# Patient Record
Sex: FEMALE | Race: BLACK OR AFRICAN AMERICAN | Employment: FULL TIME | URBAN - METROPOLITAN AREA
[De-identification: names, ages, dates, MRNs, and addresses within clinical notes are randomized per-mention and may not be internally consistent; named-entity substitution may affect disease eponyms.]

---

## 2017-06-12 ENCOUNTER — APPOINTMENT (OUTPATIENT)
Dept: GENERAL RADIOLOGY | Age: 47
End: 2017-06-12
Attending: PHYSICIAN ASSISTANT
Payer: SELF-PAY

## 2017-06-12 ENCOUNTER — HOSPITAL ENCOUNTER (EMERGENCY)
Age: 47
Discharge: HOME | End: 2017-06-12
Attending: EMERGENCY MEDICINE
Payer: SELF-PAY

## 2017-06-12 VITALS
BODY MASS INDEX: 51.91 KG/M2 | RESPIRATION RATE: 17 BRPM | HEIGHT: 63 IN | WEIGHT: 293 LBS | DIASTOLIC BLOOD PRESSURE: 83 MMHG | HEART RATE: 75 BPM | OXYGEN SATURATION: 100 % | SYSTOLIC BLOOD PRESSURE: 136 MMHG | TEMPERATURE: 97.7 F

## 2017-06-12 DIAGNOSIS — R13.10 DYSPHAGIA, UNSPECIFIED TYPE: Primary | ICD-10-CM

## 2017-06-12 PROCEDURE — 99281 EMR DPT VST MAYX REQ PHY/QHP: CPT

## 2017-06-12 PROCEDURE — 70360 X-RAY EXAM OF NECK: CPT

## 2017-06-12 RX ORDER — CIPROFLOXACIN 500 MG/1
TABLET ORAL 2 TIMES DAILY
COMMUNITY
End: 2017-12-18

## 2017-06-12 RX ORDER — HYDROCHLOROTHIAZIDE 25 MG/1
25 TABLET ORAL DAILY
COMMUNITY

## 2017-06-12 NOTE — ED TRIAGE NOTES
Pt reports having allergic reaction today. Upon arrival to triage, pt conversing freely and without any difficulty. Pt holding cell phone conversation without any difficulty. Pt reports was having \"trouble swallowing\" during allergic reaction. Pt presents in NAD. Pt now stating started as sore throat x 3 weeks.

## 2017-06-13 NOTE — DISCHARGE INSTRUCTIONS
Learning About Swallowing Problems  What are swallowing problems? Certain health problems that affect the nervous system can cause trouble swallowing. These conditions include stroke, ALS (also known as Mavis Gehrig's disease), Parkinson's disease, and multiple sclerosis. The muscles and nerves that help move food through the throat and esophagus may not work right. Growths, such as cancer, and other problems with your esophagus can also make it hard to swallow. The esophagus is the tube that leads from your throat to your stomach. How are swallowing problems diagnosed? A doctor or speech therapist will examine you to check for swallowing problems. You may get swallowing tests to check how well your throat muscles work. For these tests, you swallow a special liquid that helps the doctor see your throat and esophagus on an X-ray or video screen. Other tests use a thin, flexible tube called a scope to check for problems with your esophagus. The doctor puts the scope in your mouth and down your throat to look at your esophagus. What are the symptoms? Symptoms of swallowing problems may include:  · Trouble getting food or liquids to go down on the first try. · Gagging, choking, or coughing when you swallow. · Having food or liquids come back up through your throat, mouth, or nose after you swallow. · Feeling like foods or liquids are stuck in some part of your throat or chest.  · Pain when you swallow. How are swallowing problems treated? How swallowing problems are treated depends on the cause. The main goals of treatment will be to help you eat and swallow safely and get good nutrition. This is important for your health and quality of life. You may learn exercises to train your throat muscles to work together so you're able to swallow better. Learning certain ways to put food in your mouth or to position your head while eating may also help.   Your doctor or a speech therapist may recommend changes to your diet to help make it easier to swallow. You may need to avoid certain foods or liquids. You also may need to change the thickness of foods or liquids in your diet. To eat and swallow safely, follow any instructions you get from your doctor or therapist. These ideas may help:  · Sit upright when eating, drinking, and taking pills. · Take small bites of food. Chew completely and swallow before taking another bite. · Take small sips of liquids. Hold the liquid in your mouth as you prepare to swallow. · If eating makes you tired, eat smaller but more frequent meals. · If you cough or choke, lean forward and keep your chin tipped downward while you cough. Where can you learn more? Go to http://deana-kim.info/. Enter 153 [id number] in the search box to learn more about \"Learning About Swallowing Problems. \"  Current as of: May 27, 2016  Content Version: 11.2  © 7908-0167 [employer]. Care instructions adapted under license by [employer] (which disclaims liability or warranty for this information). If you have questions about a medical condition or this instruction, always ask your healthcare professional. [place] [address] any warranty or liability for your use of this information.

## 2017-06-13 NOTE — ED PROVIDER NOTES
[facility]Y DEPT      56 y.o. female with a PMH of Asthma, HTN and Obesity presents to the ED c/o difficulty swallowing for the past 3 weeks. Pt states when she swallows food she has to drink water down to make sure it washes down. Says she also has a hard time breathing when she has her head in a certain position. She does not recall any precipitating event. Patient is able to eat and drink. Denies fever, chills, rash, sudden onset throat swelling, or other symptoms at this time. No current facility-administered medications for this encounter. Current Outpatient Prescriptions   Medication Sig    ciprofloxacin HCl (CIPRO) 500 mg tablet Take  by mouth two (2) times a day.  hydroCHLOROthiazide (HYDRODIURIL) 25 mg tablet Take 25 mg by mouth daily.  LOSARTAN PO Take  by mouth.  ascorbic acid (VITAMIN C) 500 mg tablet Take  by mouth.  naproxen sodium (ALEVE) 220 mg tablet Take 220 mg by mouth two (2) times daily (with meals).  multivitamin with iron (DAILY MULTI-VITAMINS/IRON) tablet Take 1 Tab by mouth daily. Past Medical History:   Diagnosis Date    Asthma     Hypertension     Obese        Past Surgical History:   Procedure Laterality Date    HX ACL RECONSTRUCTION      right knee    HX GYN      hysterectomy       Family History   Problem Relation Age of Onset    Hypertension Other     Heart Disease Other     Diabetes Other     Cancer Other     Stroke Neg Hx        Social History     Social History    Marital status: LEGALLY      Spouse name: N/A    Number of children: N/A    Years of education: N/A     Occupational History    Not on file.      Social History Main Topics    Smoking status: Never Smoker    Smokeless tobacco: Not on file    Alcohol use No    Drug use: Not on file    Sexual activity: Not on file     Other Topics Concern    Not on file     Social History Narrative       Allergies   Allergen Reactions    Iodine And Iodide Containing Products Unknown (comments)       Patient's primary care provider (as noted in EPIC):  PROVIDER UNKNOWN    REVIEW OF SYSTEMS:    REVIEW OF SYSTEMS:  Constitutional:  Negative for fever, diaphoresis. HENT:  + dysphagia. Negative for congestion. Respiratory:  + SOB when moves head in certain positions. Negative for cough. Cardiovascular:  Negative for chest pain and palpitations. Musculoskeletal:  Negative for back pain. Skin:  Negative for pallor. Neurological:  Negative for weakness. All other systems negative as reviewed. Visit Vitals    /83    Pulse 75    Temp 97.7 °F (36.5 °C)    Resp 17    Ht 5' 3\" (1.6 m)    Wt 135.2 kg (298 lb)    SpO2 100%    BMI 52.79 kg/m2       PHYSICAL EXAM:    CONSTITUTIONAL:  Alert, in no apparent distress; morbidly obese. HEAD:  Normocephalic, atraumatic. EYES:  EOMI. Non-icteric sclera. Normal conjunctiva. ENTM:  Nose:  no rhinorrhea. Throat:  No tonsillar erythema, edema, or exudates, mucous membranes moist. Uvula midline, airway patent. NECK:  Supple  RESPIRATORY:  Talking in complete sentences, in NAD. Chest clear, equal breath sounds, good air movement. Without wheezes, rhonchi or rales. CARDIOVASCULAR:  Regular rate and rhythm. No murmurs, rubs, or gallops. NEURO:  Moves all four extremities, and grossly normal motor exam.  SKIN:  No rashes;  Normal for age. PSYCH:  Alert and normal affect. DIFFERENTIAL DIAGNOSES/ MEDICAL DECISION MAKING:  FB, esophageal stricture, allergic reaction, pharyngitis, versus other etiologies. ED COURSE:      Pt notes 3 weeks of dysphagia -- notes when she eats something she has to wash it down with water. Pt is able to eat and drink. Xray soft tissue neck without any FB. Also notes feeling SOB when she moves her head in certain directions or lays down with her head to the side. O2 sat and RR WNL, speaking in full sentences, in no distress. She does not have a rash.   I do not think this is an allergic reaction, despite nursing note. Likely esophageal FB vs. Stricture. Could also be anxiety. Will have her f/u with GI. Diagnosis:   1. Dysphagia, unspecified type      Disposition: Discharge    Follow-up Information     Follow up With Details Comments [address], [place] In 3 days  [address]  [facility] [address] [facility] EMERGENCY DEPT  If symptoms worsen [address] [facility]  [phone]          Patient's Medications   Start Taking    No medications on file   Continue Taking    ASCORBIC ACID (VITAMIN C) 500 MG TABLET    Take  by mouth. CIPROFLOXACIN HCL (CIPRO) 500 MG TABLET    Take  by mouth two (2) times a day. HYDROCHLOROTHIAZIDE (HYDRODIURIL) 25 MG TABLET    Take 25 mg by mouth daily. LOSARTAN PO    Take  by mouth. MULTIVITAMIN WITH IRON (DAILY MULTI-VITAMINS/IRON) TABLET    Take 1 Tab by mouth daily. NAPROXEN SODIUM (ALEVE) 220 MG TABLET    Take 220 mg by mouth two (2) times daily (with meals).      These Medications have changed    No medications on file   Stop Taking    No medications on file     ZAMZAM Good

## 2017-12-18 ENCOUNTER — HOSPITAL ENCOUNTER (EMERGENCY)
Age: 47
Discharge: HOME | End: 2017-12-18
Attending: EMERGENCY MEDICINE
Payer: SELF-PAY

## 2017-12-18 VITALS
HEIGHT: 63 IN | BODY MASS INDEX: 51.91 KG/M2 | SYSTOLIC BLOOD PRESSURE: 144 MMHG | HEART RATE: 62 BPM | RESPIRATION RATE: 20 BRPM | TEMPERATURE: 98.2 F | OXYGEN SATURATION: 99 % | DIASTOLIC BLOOD PRESSURE: 80 MMHG | WEIGHT: 293 LBS

## 2017-12-18 DIAGNOSIS — K02.9 INFECTED DENTAL CARIES: Primary | ICD-10-CM

## 2017-12-18 DIAGNOSIS — K04.7 INFECTED DENTAL CARIES: Primary | ICD-10-CM

## 2017-12-18 PROCEDURE — 99282 EMERGENCY DEPT VISIT SF MDM: CPT

## 2017-12-18 RX ORDER — PENICILLIN V POTASSIUM 500 MG/1
500 TABLET, FILM COATED ORAL 4 TIMES DAILY
Qty: 40 TAB | Refills: 0 | Status: SHIPPED | OUTPATIENT
Start: 2017-12-18 | End: 2017-12-28

## 2017-12-18 RX ORDER — HYDROCODONE BITARTRATE AND ACETAMINOPHEN 5; 325 MG/1; MG/1
TABLET ORAL
Qty: 12 TAB | Refills: 0 | Status: SHIPPED | OUTPATIENT
Start: 2017-12-18 | End: 2020-02-01

## 2017-12-18 NOTE — DISCHARGE INSTRUCTIONS
Tooth Decay: Care Instructions  Your Care Instructions    Tooth decay is damage to a tooth caused by plaque. Plaque is a thin film of bacteria that sticks to the teeth above and below the gum line. If plaque isn't removed from the teeth, it can build up and harden into tartar. The bacteria in plaque and tartar use sugars in food to make acids. These acids can cause tooth decay and gum disease. Any part of your tooth can decay, from the roots below the gum line to the chewing surface. Decay can affect the outer layer (enamel) or inner layer (dentin) of your teeth. The deeper the decay, the worse the damage. Untreated tooth decay will get worse and may lead to tooth loss. If you have a small hole (cavity) in your tooth, your dentist can repair it by removing the decay and filling the hole. If you have deeper decay, you may need more treatment. A very badly damaged tooth may have to be removed. Follow-up care is a key part of your treatment and safety. Be sure to make and go to all appointments, and call your dentist if you are having problems. It's also a good idea to know your test results and keep a list of the medicines you take. How can you care for yourself at home? If you have pain:  · Take an over-the-counter pain medicine, such as acetaminophen (Tylenol), ibuprofen (Advil, Motrin), or naproxen (Aleve). Be safe with medicines. Read and follow all instructions on the label. ¨ Do not take two or more pain medicines at the same time unless the doctor told you to. Many pain medicines have acetaminophen, which is Tylenol. Too much acetaminophen (Tylenol) can be harmful. · Put ice or a cold pack on your cheek over the tooth for 10 to 15 minutes at a time. Put a thin cloth between the ice and your skin. To prevent tooth decay  · Brush teeth twice a day, and floss once a day. Brushing with fluoride toothpaste and flossing may be enough to reverse early decay.   · Use a toothbrush with soft, rounded-end bristles and a head that is small enough to reach all parts of your teeth and mouth. Replace your toothbrush every 3 or 4 months. You may also use an electric toothbrush that has rotating and oscillating (back-and-forth) action. · Ask your dentist about having fluoride treatments at the dental office. · Brush your tongue to help get rid of bacteria. · Eat healthy foods that include whole grains, vegetables, and fruits. · Have your teeth cleaned by a professional at least two times a year. · Do not smoke or use smokeless tobacco. Tobacco can make tooth decay worse. When should you call for help? Call 911 anytime you think you may need emergency care. For example, call if:  ? · You have trouble breathing. ?Call your dentist now or seek immediate medical care if:  ? · You have new or worse symptoms of infection, such as:  ¨ Increased pain, swelling, warmth, or redness. ¨ Red streaks leading from the area. ¨ Pus draining from the area. ¨ A fever. ? Watch closely for changes in your health, and be sure to contact your doctor if:  ? · You do not get better as expected. Where can you learn more? Go to http://deana-kim.info/. Enter F909 in the search box to learn more about \"Tooth Decay: Care Instructions. \"  Current as of: May 12, 2017  Content Version: 11.4  © 4402-2864 [employer]. Care instructions adapted under license by [employer] (which disclaims liability or warranty for this information). If you have questions about a medical condition or this instruction, always ask your healthcare professional. [place] [address] any warranty or liability for your use of this information.

## 2017-12-18 NOTE — ED PROVIDER NOTES
Patient is a 56 y.o. female presenting with dental problem. The history is provided by the patient. Dental Pain    This is a new problem. The current episode started 12 to 24 hours ago. The problem occurs constantly. The problem has not changed since onset. The pain is located in the right upper mouth. The quality of the pain is aching. The pain is mild. Associated symptoms include gum redness. There was no vomiting, no nausea, no fever, no swelling, no chest pain, no shortness of breath, no headaches and no drainage. She has tried nothing for the symptoms. The treatment provided no relief. The patient has no cardiac history. Past Medical History:   Diagnosis Date    Asthma     Hypertension     Obese        Past Surgical History:   Procedure Laterality Date    HX ACL RECONSTRUCTION      right knee    HX GYN      hysterectomy         Family History:   Problem Relation Age of Onset    Hypertension Other     Heart Disease Other     Diabetes Other     Cancer Other     Stroke Neg Hx        Social History     Social History    Marital status: LEGALLY      Spouse name: N/A    Number of children: N/A    Years of education: N/A     Occupational History    Not on file. Social History Main Topics    Smoking status: Never Smoker    Smokeless tobacco: Never Used    Alcohol use No    Drug use: Not on file    Sexual activity: Not on file     Other Topics Concern    Not on file     Social History Narrative         ALLERGIES: Iodine and iodide containing products    Review of Systems   HENT: Positive for dental problem. Vitals:    12/18/17 1702   BP: 144/80   Pulse: 62   Resp: 20   Temp: 98.2 °F (36.8 °C)   SpO2: 99%   Weight: 142 kg (313 lb)   Height: 5' 3\" (1.6 m)            Physical Exam   Constitutional: She is oriented to person, place, and time. She appears well-developed and well-nourished. No distress. HENT:   Head: Normocephalic and atraumatic.    Mouth/Throat: Uvula is midline, oropharynx is clear and moist and mucous membranes are normal. She does not have dentures. No oral lesions. No trismus in the jaw. Normal dentition. Dental caries present. No dental abscesses, uvula swelling or lacerations. No oropharyngeal exudate, posterior oropharyngeal edema, posterior oropharyngeal erythema or tonsillar abscesses. Dental caries noted to RT upper molars with mild gum redness to surrounding gums. No abscess. No fluctuance. TTP. Airway is patent. tolerating secretions. Eyes: Conjunctivae are normal.   Neck: Normal range of motion. Neck supple. Cardiovascular: Normal rate, regular rhythm and normal heart sounds. Pulmonary/Chest: Effort normal and breath sounds normal. No respiratory distress. She has no wheezes. She exhibits no tenderness. Abdominal: Soft. She exhibits no distension. There is no tenderness. Musculoskeletal: Normal range of motion. Neurological: She is alert and oriented to person, place, and time. No cranial nerve deficit. Coordination normal.   Skin: Skin is warm and dry. No rash noted. She is not diaphoretic. Psychiatric: She has a normal mood and affect. Nursing note and vitals reviewed. MDM  Number of Diagnoses or Management Options  Infected dental caries:   Diagnosis management comments: No concern for sepsis, bacteremia. Will tx with pcn and norco.    Will refer to dentist and give strict return sx.      ED Course       Procedures

## 2019-08-23 ENCOUNTER — HOSPITAL ENCOUNTER (OUTPATIENT)
Dept: MAMMOGRAPHY | Age: 49
Discharge: HOME | End: 2019-08-23
Attending: INTERNAL MEDICINE
Payer: COMMERCIAL

## 2019-08-23 DIAGNOSIS — Z12.39 SCREENING FOR BREAST CANCER: ICD-10-CM

## 2019-08-23 PROCEDURE — 77063 BREAST TOMOSYNTHESIS BI: CPT

## 2020-02-01 ENCOUNTER — HOSPITAL ENCOUNTER (EMERGENCY)
Age: 50
Discharge: HOME | End: 2020-02-01
Attending: EMERGENCY MEDICINE
Payer: COMMERCIAL

## 2020-02-01 VITALS
OXYGEN SATURATION: 100 % | HEART RATE: 64 BPM | SYSTOLIC BLOOD PRESSURE: 163 MMHG | DIASTOLIC BLOOD PRESSURE: 96 MMHG | TEMPERATURE: 97.8 F | RESPIRATION RATE: 24 BRPM | WEIGHT: 293 LBS | BODY MASS INDEX: 51.91 KG/M2 | HEIGHT: 63 IN

## 2020-02-01 DIAGNOSIS — J45.21 INTERMITTENT ASTHMA WITH ACUTE EXACERBATION, UNSPECIFIED ASTHMA SEVERITY: Primary | ICD-10-CM

## 2020-02-01 PROCEDURE — 99284 EMERGENCY DEPT VISIT MOD MDM: CPT

## 2020-02-01 PROCEDURE — 94640 AIRWAY INHALATION TREATMENT: CPT

## 2020-02-01 PROCEDURE — 74011636637 HC RX REV CODE- 636/637: Performed by: EMERGENCY MEDICINE

## 2020-02-01 PROCEDURE — 74011000250 HC RX REV CODE- 250: Performed by: EMERGENCY MEDICINE

## 2020-02-01 RX ORDER — ALBUTEROL SULFATE 0.83 MG/ML
2.5 SOLUTION RESPIRATORY (INHALATION)
Status: COMPLETED | OUTPATIENT
Start: 2020-02-01 | End: 2020-02-01

## 2020-02-01 RX ORDER — FLUTICASONE PROPIONATE AND SALMETEROL 100; 50 UG/1; UG/1
1 POWDER RESPIRATORY (INHALATION) 2 TIMES DAILY
COMMUNITY
End: 2020-02-01

## 2020-02-01 RX ORDER — FLUTICASONE PROPIONATE AND SALMETEROL 100; 50 UG/1; UG/1
POWDER RESPIRATORY (INHALATION)
Qty: 1 INHALER | Refills: 0 | Status: SHIPPED | OUTPATIENT
Start: 2020-02-01

## 2020-02-01 RX ORDER — IPRATROPIUM BROMIDE AND ALBUTEROL SULFATE 2.5; .5 MG/3ML; MG/3ML
3 SOLUTION RESPIRATORY (INHALATION)
Status: COMPLETED | OUTPATIENT
Start: 2020-02-01 | End: 2020-02-01

## 2020-02-01 RX ORDER — PREDNISONE 20 MG/1
60 TABLET ORAL
Status: COMPLETED | OUTPATIENT
Start: 2020-02-01 | End: 2020-02-01

## 2020-02-01 RX ORDER — ALBUTEROL SULFATE 90 UG/1
AEROSOL, METERED RESPIRATORY (INHALATION)
COMMUNITY
Start: 2019-12-07 | End: 2020-12-06

## 2020-02-01 RX ADMIN — IPRATROPIUM BROMIDE AND ALBUTEROL SULFATE 3 ML: .5; 3 SOLUTION RESPIRATORY (INHALATION) at 16:35

## 2020-02-01 RX ADMIN — ALBUTEROL SULFATE 2.5 MG: 2.5 SOLUTION RESPIRATORY (INHALATION) at 17:06

## 2020-02-01 RX ADMIN — PREDNISONE 60 MG: 20 TABLET ORAL at 17:06

## 2020-02-01 RX ADMIN — ALBUTEROL SULFATE 2.5 MG: 2.5 SOLUTION RESPIRATORY (INHALATION) at 16:49

## 2020-02-01 NOTE — ED NOTES
Patient states that she had been taking Advair prn. Per PCP office notes, patient's Advair prescribed bid.

## 2020-02-01 NOTE — DISCHARGE INSTRUCTIONS

## 2020-02-01 NOTE — ED PROVIDER NOTES
HPI patient says she ran out of her home steroid inhaler about 2 days ago. She says since then she has had progressively worse wheezing and shortness of breath. She states a past history of asthma and says as long as she is using her inhalers as prescribed she has very few asthma exacerbations. She denies any fever chills nausea vomiting or abdominal pain. No other complaints at this time.     Past Medical History:   Diagnosis Date    Asthma     Hypertension     Obese        Past Surgical History:   Procedure Laterality Date    HX ACL RECONSTRUCTION      right knee    HX GYN      hysterectomy         Family History:   Problem Relation Age of Onset    Hypertension Other     Heart Disease Other     Diabetes Other     Cancer Other     Stroke Neg Hx        Social History     Socioeconomic History    Marital status: LEGALLY      Spouse name: Not on file    Number of children: Not on file    Years of education: Not on file    Highest education level: Not on file   Occupational History    Not on file   Social Needs    Financial resource strain: Not on file    Food insecurity:     Worry: Not on file     Inability: Not on file    Transportation needs:     Medical: Not on file     Non-medical: Not on file   Tobacco Use    Smoking status: Never Smoker    Smokeless tobacco: Never Used   Substance and Sexual Activity    Alcohol use: No    Drug use: Not on file    Sexual activity: Not on file   Lifestyle    Physical activity:     Days per week: Not on file     Minutes per session: Not on file    Stress: Not on file   Relationships    Social connections:     Talks on phone: Not on file     Gets together: Not on file     Attends [employer] service: Not on file     Active member of club or organization: Not on file     Attends meetings of clubs or organizations: Not on file     Relationship status: Not on file    Intimate partner violence:     Fear of current or ex partner: Not on file Emotionally abused: Not on file     Physically abused: Not on file     Forced sexual activity: Not on file   Other Topics Concern    Not on file   Social History Narrative    Not on file         ALLERGIES: Iodine and iodide containing products and Seafood    Review of Systems   Constitutional: Negative. HENT: Negative. Eyes: Negative. Respiratory: Positive for shortness of breath and wheezing. Cardiovascular: Negative. Gastrointestinal: Negative. Genitourinary: Negative. Musculoskeletal: Negative. Skin: Negative. Neurological: Negative. Psychiatric/Behavioral: Negative. Vitals:    02/01/20 1640 02/01/20 1645   BP: (!) 173/96 140/80   Pulse: 64    Resp: 24    Temp: 97.8 °F (36.6 °C)    SpO2: 100% 100%   Weight: 140.6 kg (310 lb)    Height: 5' 3\" (1.6 m)             Physical Exam  Vitals signs and nursing note reviewed. Constitutional:       Appearance: She is well-developed. HENT:      Head: Normocephalic and atraumatic. Eyes:      Conjunctiva/sclera: Conjunctivae normal.      Pupils: Pupils are equal, round, and reactive to light. Neck:      Musculoskeletal: Normal range of motion and neck supple. Cardiovascular:      Rate and Rhythm: Normal rate and regular rhythm. Pulmonary:      Effort: Pulmonary effort is normal.      Breath sounds: Wheezing present. Comments: Diffuse wheezes bilaterally with good air movement and normal work of breathing. Abdominal:      Palpations: Abdomen is soft. Musculoskeletal: Normal range of motion. Skin:     General: Skin is warm and dry. Neurological:      Mental Status: She is alert and oriented to person, place, and time. MDM         Procedures      Patient says she is feeling much better now after several nebulizer treatments. She says she is ready to go home and understands and agrees with the disposition and follow-up plan.   Dustin Young MD 5:45 PM

## 2020-03-31 ENCOUNTER — HOSPITAL ENCOUNTER (EMERGENCY)
Age: 50
Discharge: HOME | End: 2020-03-31
Attending: EMERGENCY MEDICINE
Payer: COMMERCIAL

## 2020-03-31 ENCOUNTER — APPOINTMENT (OUTPATIENT)
Dept: NUCLEAR MEDICINE | Age: 50
End: 2020-03-31
Attending: EMERGENCY MEDICINE
Payer: COMMERCIAL

## 2020-03-31 ENCOUNTER — APPOINTMENT (OUTPATIENT)
Dept: GENERAL RADIOLOGY | Age: 50
End: 2020-03-31
Attending: EMERGENCY MEDICINE
Payer: COMMERCIAL

## 2020-03-31 VITALS
WEIGHT: 293 LBS | HEIGHT: 63 IN | BODY MASS INDEX: 51.91 KG/M2 | OXYGEN SATURATION: 98 % | RESPIRATION RATE: 16 BRPM | SYSTOLIC BLOOD PRESSURE: 142 MMHG | DIASTOLIC BLOOD PRESSURE: 82 MMHG | TEMPERATURE: 97.9 F | HEART RATE: 61 BPM

## 2020-03-31 DIAGNOSIS — R19.7 DIARRHEA, UNSPECIFIED TYPE: ICD-10-CM

## 2020-03-31 DIAGNOSIS — R07.9 CHEST PAIN, UNSPECIFIED TYPE: Primary | ICD-10-CM

## 2020-03-31 LAB
ANION GAP SERPL CALC-SCNC: 6 MMOL/L (ref 3–18)
ATRIAL RATE: 64 BPM
BASOPHILS # BLD: 0 K/UL (ref 0–0.1)
BASOPHILS NFR BLD: 0 % (ref 0–2)
BUN SERPL-MCNC: 15 MG/DL (ref 7–18)
BUN/CREAT SERPL: 21 (ref 12–20)
CALCIUM SERPL-MCNC: 8.6 MG/DL (ref 8.5–10.1)
CALCULATED P AXIS, ECG09: 28 DEGREES
CALCULATED R AXIS, ECG10: -25 DEGREES
CALCULATED T AXIS, ECG11: 29 DEGREES
CHLORIDE SERPL-SCNC: 103 MMOL/L (ref 100–111)
CO2 SERPL-SCNC: 30 MMOL/L (ref 21–32)
CREAT SERPL-MCNC: 0.71 MG/DL (ref 0.6–1.3)
D DIMER PPP FEU-MCNC: 0.69 UG/ML(FEU)
DIAGNOSIS, 93000: NORMAL
DIFFERENTIAL METHOD BLD: NORMAL
EOSINOPHIL # BLD: 0.1 K/UL (ref 0–0.4)
EOSINOPHIL NFR BLD: 2 % (ref 0–5)
ERYTHROCYTE [DISTWIDTH] IN BLOOD BY AUTOMATED COUNT: 13.4 % (ref 11.6–14.5)
GLUCOSE SERPL-MCNC: 108 MG/DL (ref 74–99)
HCT VFR BLD AUTO: 41.7 % (ref 35–45)
HGB BLD-MCNC: 13.6 G/DL (ref 12–16)
LYMPHOCYTES # BLD: 2 K/UL (ref 0.9–3.6)
LYMPHOCYTES NFR BLD: 35 % (ref 21–52)
MCH RBC QN AUTO: 28.4 PG (ref 24–34)
MCHC RBC AUTO-ENTMCNC: 32.6 G/DL (ref 31–37)
MCV RBC AUTO: 87.1 FL (ref 74–97)
MONOCYTES # BLD: 0.4 K/UL (ref 0.05–1.2)
MONOCYTES NFR BLD: 6 % (ref 3–10)
NEUTS SEG # BLD: 3.3 K/UL (ref 1.8–8)
NEUTS SEG NFR BLD: 57 % (ref 40–73)
P-R INTERVAL, ECG05: 152 MS
PLATELET # BLD AUTO: 226 K/UL (ref 135–420)
PMV BLD AUTO: 10.8 FL (ref 9.2–11.8)
POTASSIUM SERPL-SCNC: 3.5 MMOL/L (ref 3.5–5.5)
Q-T INTERVAL, ECG07: 414 MS
QRS DURATION, ECG06: 96 MS
QTC CALCULATION (BEZET), ECG08: 427 MS
RBC # BLD AUTO: 4.79 M/UL (ref 4.2–5.3)
SODIUM SERPL-SCNC: 139 MMOL/L (ref 136–145)
TROPONIN I SERPL-MCNC: <0.02 NG/ML (ref 0–0.04)
TROPONIN I SERPL-MCNC: <0.02 NG/ML (ref 0–0.04)
VENTRICULAR RATE, ECG03: 64 BPM
WBC # BLD AUTO: 5.8 K/UL (ref 4.6–13.2)

## 2020-03-31 PROCEDURE — 93005 ELECTROCARDIOGRAM TRACING: CPT

## 2020-03-31 PROCEDURE — 80048 BASIC METABOLIC PNL TOTAL CA: CPT

## 2020-03-31 PROCEDURE — 74011250636 HC RX REV CODE- 250/636: Performed by: EMERGENCY MEDICINE

## 2020-03-31 PROCEDURE — 84484 ASSAY OF TROPONIN QUANT: CPT

## 2020-03-31 PROCEDURE — 85379 FIBRIN DEGRADATION QUANT: CPT

## 2020-03-31 PROCEDURE — 71045 X-RAY EXAM CHEST 1 VIEW: CPT

## 2020-03-31 PROCEDURE — 96374 THER/PROPH/DIAG INJ IV PUSH: CPT

## 2020-03-31 PROCEDURE — 85025 COMPLETE CBC W/AUTO DIFF WBC: CPT

## 2020-03-31 PROCEDURE — 99284 EMERGENCY DEPT VISIT MOD MDM: CPT

## 2020-03-31 PROCEDURE — A9540 TC99M MAA: HCPCS

## 2020-03-31 RX ORDER — NAPROXEN 500 MG/1
500 TABLET ORAL 2 TIMES DAILY WITH MEALS
Qty: 10 TAB | Refills: 0 | Status: SHIPPED | OUTPATIENT
Start: 2020-03-31 | End: 2020-04-05

## 2020-03-31 RX ORDER — LOPERAMIDE HYDROCHLORIDE 2 MG/1
2 CAPSULE ORAL
Qty: 8 CAP | Refills: 0 | Status: SHIPPED | OUTPATIENT
Start: 2020-03-31 | End: 2020-04-03

## 2020-03-31 RX ORDER — KETOROLAC TROMETHAMINE 15 MG/ML
15 INJECTION, SOLUTION INTRAMUSCULAR; INTRAVENOUS
Status: COMPLETED | OUTPATIENT
Start: 2020-03-31 | End: 2020-03-31

## 2020-03-31 RX ADMIN — KETOROLAC TROMETHAMINE 15 MG: 15 INJECTION, SOLUTION INTRAMUSCULAR; INTRAVENOUS at 15:44

## 2020-03-31 NOTE — DISCHARGE INSTRUCTIONS
Patient Education        Chest Pain: Care Instructions  Your Care Instructions    There are many things that can cause chest pain. Some are not serious and will get better on their own in a few days. But some kinds of chest pain need more testing and treatment. Your doctor may have recommended a follow-up visit in the next 8 to 12 hours. If you are not getting better, you may need more tests or treatment. Even though your doctor has released you, you still need to watch for any problems. The doctor carefully checked you, but sometimes problems can develop later. If you have new symptoms or if your symptoms do not get better, get medical care right away. If you have worse or different chest pain or pressure that lasts more than 5 minutes or you passed out (lost consciousness), call 911 or seek other emergency help right away. A medical visit is only one step in your treatment. Even if you feel better, you still need to do what your doctor recommends, such as going to all suggested follow-up appointments and taking medicines exactly as directed. This will help you recover and help prevent future problems. How can you care for yourself at home? · Rest until you feel better. · Take your medicine exactly as prescribed. Call your doctor if you think you are having a problem with your medicine. · Do not drive after taking a prescription pain medicine. When should you call for help? Call 911 if:    · You passed out (lost consciousness).     · You have severe difficulty breathing.     · You have symptoms of a heart attack. These may include:  ? Chest pain or pressure, or a strange feeling in your chest.  ? Sweating. ? Shortness of breath. ? Nausea or vomiting. ? Pain, pressure, or a strange feeling in your back, neck, jaw, or upper belly or in one or both shoulders or arms. ? Lightheadedness or sudden weakness. ? A fast or irregular heartbeat.   After you call  911, the  may tell you to chew 1 adult-strength or 2 to 4 low-dose aspirin. Wait for an ambulance. Do not try to drive yourself.    Call your doctor today if:    · You have any trouble breathing.     · Your chest pain gets worse.     · You are dizzy or lightheaded, or you feel like you may faint.     · You are not getting better as expected.     · You are having new or different chest pain. Where can you learn more? Go to http://deana-kim.info/  Enter A120 in the search box to learn more about \"Chest Pain: Care Instructions. \"  Current as of: June 26, 2019Content Version: 12.4  © 3316-5317 [employer]. Care instructions adapted under license by [employer] (which disclaims liability or warranty for this information). If you have questions about a medical condition or this instruction, always ask your healthcare professional. [place] [address] any warranty or liability for your use of this information. Patient Education        Diarrhea: Care Instructions  Your Care Instructions    Diarrhea is loose, watery stools (bowel movements). The exact cause is often hard to find. Sometimes diarrhea is your body's way of getting rid of what caused an upset stomach. Viruses, food poisoning, and many medicines can cause diarrhea. Some people get diarrhea in response to emotional stress, anxiety, or certain foods. Almost everyone has diarrhea now and then. It usually isn't serious, and your stools will return to normal soon. The important thing to do is replace the fluids you have lost, so you can prevent dehydration. The doctor has checked you carefully, but problems can develop later. If you notice any problems or new symptoms, get medical treatment right away. Follow-up care is a key part of your treatment and safety. Be sure to make and go to all appointments, and call your doctor if you are having problems.  It's also a good idea to know your test results and keep a list of the medicines you take. How can you care for yourself at home? · Watch for signs of dehydration, which means your body has lost too much water. Dehydration is a serious condition and should be treated right away. Signs of dehydration are:  ? Increasing thirst and dry eyes and mouth. ? Feeling faint or lightheaded. ? A smaller amount of urine than normal.  · To prevent dehydration, drink plenty of fluids. Choose water and other caffeine-free clear liquids until you feel better. If you have kidney, heart, or liver disease and have to limit fluids, talk with your doctor before you increase the amount of fluids you drink. · Begin eating small amounts of mild foods the next day, if you feel like it. ? Try yogurt that has live cultures of Lactobacillus. (Check the label.)  ? Avoid spicy foods, fruits, alcohol, and caffeine until 48 hours after all symptoms are gone. ? Avoid chewing gum that contains sorbitol. ? Avoid dairy products (except for yogurt with Lactobacillus) while you have diarrhea and for 3 days after symptoms are gone. · The doctor may recommend that you take over-the-counter medicine, such as loperamide (Imodium), if you still have diarrhea after 6 hours. Read and follow all instructions on the label. Do not use this medicine if you have bloody diarrhea, a high fever, or other signs of serious illness. Call your doctor if you think you are having a problem with your medicine. When should you call for help? Call 911 anytime you think you may need emergency care. For example, call if:    · You passed out (lost consciousness).     · Your stools are maroon or very bloody.    Call your doctor now or seek immediate medical care if:    · You are dizzy or lightheaded, or you feel like you may faint.     · Your stools are black and look like tar, or they have streaks of blood.     · You have new or worse belly pain.     · You have symptoms of dehydration, such as:  ? Dry eyes and a dry mouth.   ? Passing only a little dark urine. ? Feeling thirstier than usual.     · You have a new or higher fever.    Watch closely for changes in your health, and be sure to contact your doctor if:    · Your diarrhea is getting worse.     · You see pus in the diarrhea.     · You are not getting better after 2 days (48 hours). Where can you learn more? Go to http://deana-kim.info/  Enter [id number] in the search box to learn more about \"Diarrhea: Care Instructions. \"  Current as of: June 26, 2019Content Version: 12.4  © 6231-8650 [employer]. Care instructions adapted under license by [employer] (which disclaims liability or warranty for this information). If you have questions about a medical condition or this instruction, always ask your healthcare professional. [address] any warranty or liability for your use of this information.

## 2020-03-31 NOTE — ED TRIAGE NOTES
Pt presents with mid to left sided chest pain, left arm pain, left sided neck pain that started last pm. Pt also reports diarrhea x 3 days. Pt denies shortness of breath at this present time. Pt states chest pain worse when laying down or laying on left side, better when sitting up.

## 2020-03-31 NOTE — ED NOTES
Patient back from [facility]. No ss distress. Placed back on monitor and blood drawn for repeat troponin. Patient updated and call bell within reach.

## 2020-03-31 NOTE — ED NOTES
[facility]. Pt informed if test is positive for PE pt would be admitted. Pt states refuses admission to [facility]. Dr. Claire Rivera notified.

## 2020-03-31 NOTE — ED PROVIDER NOTES
EMERGENCY DEPARTMENT HISTORY AND PHYSICAL EXAM    3:05 PM      Date: 3/31/2020  Patient Name: Alejandro Mascorro    History of Presenting Illness     Chief Complaint   Patient presents with    Chest Pain    Arm Pain    Diarrhea         History Provided By: Patient    Additional History (Context): Alejandro Mascorro is a 53 y.o. female with hypertension and asthma who presents with complaint of left-sided chest pain that started last night. She states that the pain radiated to her left shoulder and also with associated tingling down her left arm. She states that the chest pain is sharp and is about a 6 out of 10 now, states was a 10 out of 10 last night. She does report having a few episodes of diarrhea in the past couple days. She denies any sick contacts. Patient states that her mother  of cardiac arrest when she was 53years old. Patient denies any cardiac problems herself. She denies any cough, fever, shortness of breath, numbness, dizziness, weakness, sweating, vomiting, abdominal pain, leg pain, recent travel, no other complaint. PCP: Marianna Owens MD        Past History     Past Medical History:  Past Medical History:   Diagnosis Date    Asthma     Hypertension     Obese        Past Surgical History:  Past Surgical History:   Procedure Laterality Date    HX ACL RECONSTRUCTION      right knee    HX GYN      hysterectomy       Family History:  Family History   Problem Relation Age of Onset    Hypertension Other     Heart Disease Other     Diabetes Other     Cancer Other     Stroke Neg Hx        Social History:  Social History     Tobacco Use    Smoking status: Never Smoker    Smokeless tobacco: Never Used   Substance Use Topics    Alcohol use: No    Drug use: Not on file       Allergies:   Allergies   Allergen Reactions    Iodine And Iodide Containing Products Unknown (comments)    Seafood Hives         Review of Systems       Review of Systems   Constitutional: Negative for chills and fever. HENT: Negative for congestion, rhinorrhea, sore throat and trouble swallowing. Eyes: Negative for visual disturbance. Respiratory: Negative for cough, shortness of breath and wheezing. Cardiovascular: Positive for chest pain. Gastrointestinal: Positive for diarrhea. Negative for abdominal pain, constipation, nausea and vomiting. Endocrine: Negative for polyuria. Genitourinary: Negative for dysuria. Musculoskeletal: Negative for arthralgias and neck stiffness. Skin: Negative for pallor and rash. Neurological: Negative for dizziness, weakness, numbness and headaches. Hematological: Does not bruise/bleed easily. Psychiatric/Behavioral: Negative for confusion and dysphoric mood. All other systems reviewed and are negative. Physical Exam     Visit Vitals  /65   Pulse 62   Temp 97.9 °F (36.6 °C)   Resp 17   Ht 5' 3\" (1.6 m)   Wt 140.6 kg (310 lb)   SpO2 99%   BMI 54.91 kg/m²         Physical Exam  Vitals signs and nursing note reviewed. Constitutional:       General: She is not in acute distress. Appearance: She is well-developed. She is not diaphoretic. HENT:      Head: Normocephalic and atraumatic. Eyes:      General: No scleral icterus. Conjunctiva/sclera: Conjunctivae normal.      Pupils: Pupils are equal, round, and reactive to light. Neck:      Musculoskeletal: Normal range of motion and neck supple. Cardiovascular:      Rate and Rhythm: Normal rate. Heart sounds: Normal heart sounds. No murmur. No gallop. Comments: Capillary refill < 3 seconds  Pulmonary:      Effort: Pulmonary effort is normal. No respiratory distress. Breath sounds: Normal breath sounds. No decreased breath sounds, wheezing, rhonchi or rales. Comments: Does have some anterior left-sided chest wall tenderness to palpation  No crepitance  Chest:      Chest wall: Tenderness present.    Abdominal:      General: Bowel sounds are normal. There is no distension. Palpations: Abdomen is soft. Tenderness: There is no abdominal tenderness. Musculoskeletal: Normal range of motion. Right lower leg: No edema. Left lower leg: No edema. Comments: No calf tenderness   Lymphadenopathy:      Cervical: No cervical adenopathy. Skin:     General: Skin is warm and dry. Neurological:      General: No focal deficit present. Mental Status: She is alert and oriented to person, place, and time. Cranial Nerves: No cranial nerve deficit. Sensory: No sensory deficit. Motor: No weakness. Coordination: Coordination normal.           Diagnostic Study Results     Labs -  Recent Results (from the past 12 hour(s))   EKG, 12 LEAD, INITIAL    Collection Time: 03/31/20  3:01 PM   Result Value Ref Range    Ventricular Rate 64 BPM    Atrial Rate 64 BPM    P-R Interval 152 ms    QRS Duration 96 ms    Q-T Interval 414 ms    QTC Calculation (Bezet) 427 ms    Calculated P Axis 28 degrees    Calculated R Axis -25 degrees    Calculated T Axis 29 degrees    Diagnosis       Sinus rhythm with premature atrial complexes  Otherwise normal ECG  No previous ECGs available  Confirmed by Jude Bowels ([id number]) on 3/31/2020 4:01:50 PM     CBC WITH AUTOMATED DIFF    Collection Time: 03/31/20  3:08 PM   Result Value Ref Range    WBC 5.8 4.6 - 13.2 K/uL    RBC 4.79 4.20 - 5.30 M/uL    HGB 13.6 12.0 - 16.0 g/dL    HCT 41.7 35.0 - 45.0 %    MCV 87.1 74.0 - 97.0 FL    MCH 28.4 24.0 - 34.0 PG    MCHC 32.6 31.0 - 37.0 g/dL    RDW 13.4 11.6 - 14.5 %    PLATELET 893 814 - 807 K/uL    MPV 10.8 9.2 - 11.8 FL    NEUTROPHILS 57 40 - 73 %    LYMPHOCYTES 35 21 - 52 %    MONOCYTES 6 3 - 10 %    EOSINOPHILS 2 0 - 5 %    BASOPHILS 0 0 - 2 %    ABS. NEUTROPHILS 3.3 1.8 - 8.0 K/UL    ABS. LYMPHOCYTES 2.0 0.9 - 3.6 K/UL    ABS. MONOCYTES 0.4 0.05 - 1.2 K/UL    ABS. EOSINOPHILS 0.1 0.0 - 0.4 K/UL    ABS.  BASOPHILS 0.0 0.0 - 0.1 K/UL    DF AUTOMATED     METABOLIC PANEL, BASIC Collection Time: 03/31/20  3:08 PM   Result Value Ref Range    Sodium 139 136 - 145 mmol/L    Potassium 3.5 3.5 - 5.5 mmol/L    Chloride 103 100 - 111 mmol/L    CO2 30 21 - 32 mmol/L    Anion gap 6 3.0 - 18 mmol/L    Glucose 108 (H) 74 - 99 mg/dL    BUN 15 7.0 - 18 MG/DL    Creatinine 0.71 0.6 - 1.3 MG/DL    BUN/Creatinine ratio 21 (H) 12 - 20      GFR est AA >60 >60 ml/min/1.73m2    GFR est non-AA >60 >60 ml/min/1.73m2    Calcium 8.6 8.5 - 10.1 MG/DL   TROPONIN I    Collection Time: 03/31/20  3:08 PM   Result Value Ref Range    Troponin-I, QT <0.02 0.0 - 0.045 NG/ML   D DIMER    Collection Time: 03/31/20  3:08 PM   Result Value Ref Range    D DIMER 0.69 (H) <0.46 ug/ml(FEU)   TROPONIN I    Collection Time: 03/31/20  6:29 PM   Result Value Ref Range    Troponin-I, QT <0.02 0.0 - 0.045 NG/ML       Radiologic Studies -   NM LUNG SCAN PERF   Final Result   IMPRESSION:      Examination within normal limits. No evidence of pulmonary embolism. XR CHEST PORT   Final Result   IMPRESSION:      No acute findings. Medical Decision Making   I am the first provider for this patient. I reviewed the vital signs, available nursing notes, past medical history, past surgical history, family history and social history. Vital Signs-Reviewed the patient's vital signs. Pulse Oximetry Analysis -  98 on room air (Interpretation) normal        EKG: Interpreted by the EP Dr. Sandra Boss.    Time Interpreted: 3:03 PM   Rate: 64   Rhythm: Sinus rhythm   Interpretation: Normal QRS duration, normal QTC, few PACs, no ST elevation, no T wave inversion       Records Reviewed: Nursing Notes and Old Medical Records (Time of Review: 3:05 PM)    Provider Notes (Medical Decision Making): DDX: Musculoskeletal, cardiac, PE, anxiety, metabolic, GERD, pneumothorax, dehydration    Labs, d-dimer, EKG, chest x-ray analgesia    MDM    Medications   ketorolac (TORADOL) injection 15 mg (15 mg IntraVENous Given 3/31/20 6697)   technetium albumin aggregated (MAA) solution 5.5 millicurie (5.5 millicuries IntraVENous Given 3/31/20 1720)           ED Course: Progress Notes, Reevaluation, and Consults:  WBC within normal limits  Creatinine within normal was  Elevated d-dimer    Patient cannot have iodine or iodine-containing products. Apparently can get IV dye. No history of getting IV contrast she states    We will send for VQ scan    Initial trop (-), will get repeat     4:10pm  I discussed patient with Dr. Skinny Seay, ED attending at [facility] and will be sending for VQ scan, he is now aware. 4:30 PM  Reassessed patient and she is in no apparent distress. She will soon be going for VQ scan which she agrees to. Negative for PE    No diarrhea    I have reassessed the patient. I have discussed the workup, results and plan with the patient and patient is in agreement. Patient is feeling better. Patient will be prescribed Naprosyn, saline. Patient was discharge in stable condition. Patient was given outpatient follow up. Patient is to return to emergency department if any new or worsening condition. Diagnosis     Clinical Impression:   1. Chest pain, unspecified type    2. Diarrhea, unspecified type        Disposition: Discharged    Follow-up Information     Follow up With Specialties Details Why Contact Info    Elia Snyder,  Cardiology Schedule an appointment as soon as possible for a visit in 3 days  [address] [facility] [address] [facility] [address] [facility]      Mindy Andrdae MD Internal Medicine Schedule an appointment as soon as possible for a visit  [address]  [place] [place] [address]  [phone] [address] [facility] EMERGENCY DEPT Emergency Medicine  As needed, If symptoms worsen [address] [facility]  [phone]           Patient's Medications   Start Taking    LOPERAMIDE (IMODIUM) 2 MG CAPSULE    Take 1 Cap by mouth three (3) times daily as needed for Diarrhea for up to 3 days.     NAPROXEN (NAPROSYN) 500 MG TABLET    Take 1 Tab by mouth two (2) times daily (with meals) for 5 days. Indications: pain   Continue Taking    ALBUTEROL (VENTOLIN HFA) 90 MCG/ACTUATION INHALER    1-2 inhalations every 4-6 hours as needed for wheezing. Dispense spacer as needed. ASCORBIC ACID (VITAMIN C) 500 MG TABLET    Take  by mouth. FLUTICASONE PROPION-SALMETEROL (ADVAIR DISKUS) 100-50 MCG/DOSE DISKUS INHALER    Take 1 puff every 12 hours as needed for asthma control    HYDROCHLOROTHIAZIDE (HYDRODIURIL) 25 MG TABLET    Take 25 mg by mouth daily. MULTIVITAMIN WITH IRON (DAILY MULTI-VITAMINS/IRON) TABLET    Take 1 Tab by mouth daily. These Medications have changed    No medications on file   Stop Taking    LOSARTAN PO    Take 25 mg by mouth daily. DO Mark Panda medical dictation software was used for portions of this report. Unintended transcription errors may occur. My signature above authenticates this document and my orders, the final    diagnosis (es), discharge prescription (s), and instructions in the Epic    record.

## 2020-03-31 NOTE — ED TRIAGE NOTES
[facility] here for patient transport now. Charge nurse, Erika Arguello, at [facility] ED aware.

## 2020-09-09 ENCOUNTER — HOSPITAL ENCOUNTER (OUTPATIENT)
Dept: MAMMOGRAPHY | Age: 50
Discharge: HOME | End: 2020-09-09
Attending: INTERNAL MEDICINE
Payer: COMMERCIAL

## 2020-09-09 ENCOUNTER — HOSPITAL ENCOUNTER (OUTPATIENT)
Dept: BONE DENSITY | Age: 50
Discharge: HOME | End: 2020-09-09
Attending: INTERNAL MEDICINE
Payer: COMMERCIAL

## 2020-09-09 DIAGNOSIS — Z12.31 VISIT FOR SCREENING MAMMOGRAM: ICD-10-CM

## 2020-09-09 DIAGNOSIS — Z13.820 SPECIAL SCREENING FOR OSTEOPOROSIS: ICD-10-CM

## 2020-09-09 PROCEDURE — 77080 DXA BONE DENSITY AXIAL: CPT

## 2020-09-09 PROCEDURE — 77063 BREAST TOMOSYNTHESIS BI: CPT

## 2021-11-24 ENCOUNTER — TRANSCRIBE ORDER (OUTPATIENT)
Dept: SCHEDULING | Age: 51
End: 2021-11-24

## 2021-11-24 DIAGNOSIS — Z12.31 VISIT FOR SCREENING MAMMOGRAM: Primary | ICD-10-CM

## 2022-03-25 ENCOUNTER — HOSPITAL ENCOUNTER (OUTPATIENT)
Dept: MAMMOGRAPHY | Age: 52
Discharge: HOME | End: 2022-03-25
Attending: FAMILY MEDICINE
Payer: COMMERCIAL

## 2022-03-25 DIAGNOSIS — Z12.31 VISIT FOR SCREENING MAMMOGRAM: ICD-10-CM

## 2022-03-25 PROCEDURE — 77063 BREAST TOMOSYNTHESIS BI: CPT
